# Patient Record
Sex: MALE | Race: WHITE | NOT HISPANIC OR LATINO | URBAN - NONMETROPOLITAN AREA
[De-identification: names, ages, dates, MRNs, and addresses within clinical notes are randomized per-mention and may not be internally consistent; named-entity substitution may affect disease eponyms.]

---

## 2019-07-23 ENCOUNTER — IMPORTED ENCOUNTER (OUTPATIENT)
Dept: URBAN - NONMETROPOLITAN AREA CLINIC 1 | Facility: CLINIC | Age: 84
End: 2019-07-23

## 2019-07-23 PROCEDURE — 92014 COMPRE OPH EXAM EST PT 1/>: CPT

## 2019-07-23 PROCEDURE — 92015 DETERMINE REFRACTIVE STATE: CPT

## 2019-07-23 NOTE — PATIENT DISCUSSION
Pseudophakia OU-  discussed findings w/patient-  no changes noted at this time-  continue to monitor yearly or prnMixed Astigmatism OD/Compound Myopic Astigmatism OS w/Presbyopia-  discussed findings w/patient-  new spectacle Rx issued-  monitor yearly or prn; 's Notes: bilateral hip replacements 2017MR 7/23/2019DFE 7/23/2019

## 2019-08-06 PROBLEM — Z96.1: Noted: 2018-04-11

## 2019-08-06 PROBLEM — H52.01: Noted: 2019-08-06

## 2019-08-06 PROBLEM — H52.4: Noted: 2019-07-23

## 2019-08-06 PROBLEM — H52.12: Noted: 2019-08-06

## 2019-08-06 PROBLEM — H43.813: Noted: 2019-07-23

## 2019-08-06 PROBLEM — H52.223: Noted: 2019-08-06

## 2021-02-17 NOTE — PATIENT DISCUSSION
IOP is good--pt will call with any symptoms and disc no antihistamines or other meds that dilate the eye.

## 2022-04-09 ASSESSMENT — TONOMETRY
OD_IOP_MMHG: 06
OS_IOP_MMHG: 07

## 2022-04-09 ASSESSMENT — VISUAL ACUITY
OS_SC: 20/25-2
OD_SC: 20/30